# Patient Record
Sex: FEMALE | Race: WHITE | Employment: UNEMPLOYED | URBAN - METROPOLITAN AREA
[De-identification: names, ages, dates, MRNs, and addresses within clinical notes are randomized per-mention and may not be internally consistent; named-entity substitution may affect disease eponyms.]

---

## 2021-05-17 ENCOUNTER — HOSPITAL ENCOUNTER (OUTPATIENT)
Dept: MAMMOGRAPHY | Age: 61
Discharge: HOME | End: 2021-05-17
Attending: OBSTETRICS & GYNECOLOGY
Payer: COMMERCIAL

## 2021-05-17 DIAGNOSIS — Z12.31 OTHER SCREENING MAMMOGRAM: ICD-10-CM

## 2021-05-17 DIAGNOSIS — M85.80 OSTEOPENIA, UNSPECIFIED LOCATION: ICD-10-CM

## 2021-05-17 PROCEDURE — 77080 DXA BONE DENSITY AXIAL: CPT

## 2021-05-17 PROCEDURE — 77067 SCR MAMMO BI INCL CAD: CPT

## 2021-05-21 PROBLEM — Z78.0 OSTEOPENIA AFTER MENOPAUSE: Status: ACTIVE | Noted: 2021-05-21

## 2021-05-21 PROBLEM — M85.80 OSTEOPENIA AFTER MENOPAUSE: Status: ACTIVE | Noted: 2021-05-21

## 2021-08-21 ENCOUNTER — HOSPITAL ENCOUNTER (EMERGENCY)
Age: 61
Discharge: HOME | End: 2021-08-21
Attending: EMERGENCY MEDICINE
Payer: COMMERCIAL

## 2021-08-21 VITALS
HEART RATE: 65 BPM | RESPIRATION RATE: 18 BRPM | BODY MASS INDEX: 36.14 KG/M2 | SYSTOLIC BLOOD PRESSURE: 138 MMHG | DIASTOLIC BLOOD PRESSURE: 74 MMHG | OXYGEN SATURATION: 98 % | WEIGHT: 204 LBS | TEMPERATURE: 98 F

## 2021-08-21 DIAGNOSIS — T78.40XA ALLERGIC REACTION, INITIAL ENCOUNTER: Primary | ICD-10-CM

## 2021-08-21 PROCEDURE — 74011250636 HC RX REV CODE- 250/636: Performed by: EMERGENCY MEDICINE

## 2021-08-21 PROCEDURE — 99284 EMERGENCY DEPT VISIT MOD MDM: CPT

## 2021-08-21 PROCEDURE — 96374 THER/PROPH/DIAG INJ IV PUSH: CPT

## 2021-08-21 RX ORDER — SODIUM CHLORIDE 0.9 % (FLUSH) 0.9 %
5-10 SYRINGE (ML) INJECTION AS NEEDED
Status: DISCONTINUED | OUTPATIENT
Start: 2021-08-21 | End: 2021-08-22 | Stop reason: HOSPADM

## 2021-08-21 RX ORDER — SODIUM CHLORIDE 0.9 % (FLUSH) 0.9 %
5-10 SYRINGE (ML) INJECTION EVERY 8 HOURS
Status: DISCONTINUED | OUTPATIENT
Start: 2021-08-21 | End: 2021-08-22 | Stop reason: HOSPADM

## 2021-08-21 RX ORDER — FAMOTIDINE 10 MG/ML
40 INJECTION INTRAVENOUS
Status: DISCONTINUED | OUTPATIENT
Start: 2021-08-21 | End: 2021-08-21

## 2021-08-21 RX ORDER — DIPHENHYDRAMINE HYDROCHLORIDE 50 MG/ML
50 INJECTION, SOLUTION INTRAMUSCULAR; INTRAVENOUS
Status: DISCONTINUED | OUTPATIENT
Start: 2021-08-21 | End: 2021-08-21

## 2021-08-21 RX ORDER — EPINEPHRINE 0.3 MG/.3ML
0.3 INJECTION SUBCUTANEOUS
Qty: 2 SYRINGE | Refills: 0 | Status: SHIPPED | OUTPATIENT
Start: 2021-08-21 | End: 2021-08-21

## 2021-08-21 RX ORDER — PREDNISONE 50 MG/1
50 TABLET ORAL DAILY
Qty: 5 TABLET | Refills: 0 | Status: SHIPPED | OUTPATIENT
Start: 2021-08-21 | End: 2021-08-26

## 2021-08-21 RX ADMIN — METHYLPREDNISOLONE SODIUM SUCCINATE 125 MG: 125 INJECTION, POWDER, FOR SOLUTION INTRAMUSCULAR; INTRAVENOUS at 22:35

## 2021-08-22 NOTE — ED TRIAGE NOTES
Pt states she is feeling swelling to her throat and back of her tongue unsure what the allergy is.  Pt states this has been happening on and off and she is to follow up with an allergist

## 2021-08-22 NOTE — ED NOTES
I have reviewed discharge instructions with the patient. The patient verbalized understanding. Patient left ED via Discharge Method: ambulatory to Home with ( self,). Opportunity for questions and clarification provided. Patient given 2 scripts. To continue your aftercare when you leave the hospital, you may receive an automated call from our care team to check in on how you are doing. This is a free service and part of our promise to provide the best care and service to meet your aftercare needs.  If you have questions, or wish to unsubscribe from this service please call [phone]. Thank you for Choosing our [facility] Emergency Department.

## 2021-08-22 NOTE — ED PROVIDER NOTES
Mask was worn during the entire patient examination. Demi Singh is a 61 y.o. female who presents to the ED with a chief complaint of allergic reaction. Patient has a history of several months of off and on throat irritation symptoms. Patient has tried to figure out what is causing it but has been unsuccessful. She has been planning on getting in to see an allergies but has not yet. Tonight she had some swelling to the sides of her throat and felt some tightness this has gradually improved. Patient did take zyrtec. The history is provided by the patient. Allergic Reaction   Pertinent negatives include no nausea, no vomiting and no shortness of breath.         Past Medical History:   Diagnosis Date    Depression     History of chicken pox     Osteopenia 2018    Post-menopausal        Past Surgical History:   Procedure Laterality Date    HX  SECTION      x 2     HX  SECTION      x2         Family History:   Problem Relation Age of Onset    Heart Disease Mother 48        ablation, pacer, bronchiectasis,  at 80    Hypertension Father 48    Cancer Father     Prostate Cancer Father    [address] [facility] Arthritis-rheumatoid Father     Osteoporosis Sister 48    Breast Cancer Maternal Aunt 61    Colon Cancer Maternal Aunt 48    Colon Cancer Maternal Uncle 61    Diabetes Maternal Grandfather 79    Osteoporosis Sister 61    Ovarian Cancer Sister 77       Social History     Socioeconomic History    Marital status:      Spouse name: Not on file    Number of children: Not on file    Years of education: Not on file    Highest education level: Not on file   Occupational History    Not on file   Tobacco Use    Smoking status: Never Smoker    Smokeless tobacco: Never Used   Vaping Use    Vaping Use: Never used   Substance and Sexual Activity    Alcohol use: No    Drug use: Not on file    Sexual activity: Yes     Partners: Male   Other Topics Concern    Not on file   Social History Narrative    Not on file     Social Determinants of Health     Financial Resource Strain:     Difficulty of Paying Living Expenses:    Food Insecurity:     Worried About Running Out of Food in the Last Year:     [address] in the Last Year:    Transportation Needs:     Lack of Transportation (Medical):  Lack of Transportation (Non-Medical):    Physical Activity:     Days of Exercise per Week:     Minutes of Exercise per Session:    Stress:     Feeling of Stress :    Social Connections:     Frequency of Communication with Friends and Family:     Frequency of Social Gatherings with Friends and Family:     Attends Shinto Services:     Active Member of Clubs or Organizations:     Attends Club or Organization Meetings:     Marital Status:    Intimate Partner Violence:     Fear of Current or Ex-Partner:     Emotionally Abused:     Physically Abused:     Sexually Abused: ALLERGIES: Cantaloupe, Codeine, Fish containing products, Naproxen, and Sulfa (sulfonamide antibiotics)    Review of Systems   Constitutional: Negative for chills and fever. Respiratory: Negative for chest tightness, shortness of breath, wheezing and stridor. Cardiovascular: Negative for chest pain and palpitations. Gastrointestinal: Negative for abdominal pain, diarrhea, nausea and vomiting. Musculoskeletal: Negative for arthralgias, myalgias, neck pain and neck stiffness. Skin: Negative for color change, pallor and wound. Neurological: Negative for dizziness, tremors, seizures, weakness, light-headedness, numbness and headaches. All other systems reviewed and are negative. Vitals:    08/21/21 2156 08/21/21 2200 08/21/21 2220 08/21/21 2223   BP: (!) 170/76 (!) 150/70 (!) 146/70    Pulse: (!) 59 (!) 59  61   Resp:       Temp:       SpO2: 97% 98% 97% 97%   Weight:                Physical Exam  Vitals and nursing note reviewed. Constitutional:       General: She is not in acute distress. Appearance: She is well-developed. She is not ill-appearing, toxic-appearing or diaphoretic. HENT:      Head: Normocephalic and atraumatic. Mouth/Throat:      Comments: Oropharynx is patent. Eyes:      General: No scleral icterus. Conjunctiva/sclera: Conjunctivae normal.   Cardiovascular:      Rate and Rhythm: Normal rate and regular rhythm. Pulmonary:      Effort: Pulmonary effort is normal. No respiratory distress. Breath sounds: No stridor. Abdominal:      General: Abdomen is flat. Tenderness: There is no abdominal tenderness. There is no guarding or rebound. Musculoskeletal:         General: Normal range of motion. Cervical back: Normal range of motion and neck supple. No rigidity or tenderness. Skin:     Coloration: Skin is not jaundiced or pale. Findings: No rash. Neurological:      General: No focal deficit present. Mental Status: She is alert. Mental status is at baseline. Psychiatric:         Mood and Affect: Mood normal.         Behavior: Behavior normal.          MDM  Number of Diagnoses or Management Options  Diagnosis management comments: Patient looks well her airways are patent. She is in no distress. We will start her on prednisone and give her an epipen to use as needed no signs of anaphylaxis now. Rika Sanchez MD; 8/21/2021 @10:51 PM Voice dictation software was used during the making of this note. This software is not perfect and grammatical and other typographical errors may be present.   This note has not been proofread for errors.  ===================================================================            Procedures

## 2021-09-08 PROBLEM — I48.0 PAROXYSMAL ATRIAL FIBRILLATION (HCC): Status: ACTIVE | Noted: 2021-09-08

## 2022-01-12 PROBLEM — R00.2 PALPITATION: Status: ACTIVE | Noted: 2018-10-23

## 2022-01-12 PROBLEM — M81.0 MENOPAUSAL OSTEOPOROSIS: Status: ACTIVE | Noted: 2018-10-23

## 2022-01-12 PROBLEM — R53.83 MALAISE AND FATIGUE: Status: ACTIVE | Noted: 2018-10-23

## 2022-01-12 PROBLEM — G44.209 ACUTE NON INTRACTABLE TENSION-TYPE HEADACHE: Status: ACTIVE | Noted: 2018-10-23

## 2022-01-12 PROBLEM — R53.81 MALAISE AND FATIGUE: Status: ACTIVE | Noted: 2018-10-23

## 2022-01-12 PROBLEM — R03.0 ELEVATED BLOOD PRESSURE READING: Status: ACTIVE | Noted: 2018-10-23

## 2022-03-18 PROBLEM — M81.0 MENOPAUSAL OSTEOPOROSIS: Status: ACTIVE | Noted: 2018-10-23

## 2022-03-18 PROBLEM — R03.0 ELEVATED BLOOD PRESSURE READING: Status: ACTIVE | Noted: 2018-10-23

## 2022-03-19 PROBLEM — R53.81 MALAISE AND FATIGUE: Status: ACTIVE | Noted: 2018-10-23

## 2022-03-19 PROBLEM — M85.80 OSTEOPENIA AFTER MENOPAUSE: Status: ACTIVE | Noted: 2021-05-21

## 2022-03-19 PROBLEM — R00.2 PALPITATION: Status: ACTIVE | Noted: 2018-10-23

## 2022-03-19 PROBLEM — I48.0 PAROXYSMAL ATRIAL FIBRILLATION (HCC): Status: ACTIVE | Noted: 2021-09-08

## 2022-03-19 PROBLEM — R53.83 MALAISE AND FATIGUE: Status: ACTIVE | Noted: 2018-10-23

## 2022-03-19 PROBLEM — Z78.0 OSTEOPENIA AFTER MENOPAUSE: Status: ACTIVE | Noted: 2021-05-21

## 2022-03-19 PROBLEM — G44.209 ACUTE NON INTRACTABLE TENSION-TYPE HEADACHE: Status: ACTIVE | Noted: 2018-10-23

## 2022-08-01 ENCOUNTER — TELEPHONE (OUTPATIENT)
Dept: OBGYN CLINIC | Age: 62
End: 2022-08-01

## 2022-08-01 NOTE — TELEPHONE ENCOUNTER
Phone call from patient stating she has a sore vaginal lump that just formed over the last few days. Problem visit appointment made for tomorrow morning for patient with Dr Yonas Michelle.

## 2022-08-02 ENCOUNTER — OFFICE VISIT (OUTPATIENT)
Dept: OBGYN CLINIC | Age: 62
End: 2022-08-02
Payer: COMMERCIAL

## 2022-08-02 VITALS
DIASTOLIC BLOOD PRESSURE: 86 MMHG | SYSTOLIC BLOOD PRESSURE: 158 MMHG | BODY MASS INDEX: 36.14 KG/M2 | WEIGHT: 204 LBS | HEIGHT: 63 IN

## 2022-08-02 DIAGNOSIS — N90.7 INCLUSION CYST OF VULVA: Primary | ICD-10-CM

## 2022-08-02 PROCEDURE — 99213 OFFICE O/P EST LOW 20 MIN: CPT | Performed by: OBSTETRICS & GYNECOLOGY

## 2022-08-02 NOTE — PROGRESS NOTES
2022    Luacs Atkinson  1960    64 y.o. No obstetric history on file. female with complaints of painful vaginal lump. Noted a few days ago. Has never had anything like this before. No assoc symptoms like fever, malaise. Has used epsom salt bath, vaseline. No flowsheet data found. OB History          2    Para   2    Term   2            AB        Living   2         SAB        IAB        Ectopic        Molar        Multiple        Live Births   2                Past Medical History:   Diagnosis Date    Atrial fibrillation, controlled ([address]     History of chicken pox     Osteopenia 2018    Post-menopausal      Past Surgical History:   Procedure Laterality Date     SECTION      x 2      SECTION      x2       Current Outpatient Medications on File Prior to Visit   Medication Sig Dispense Refill    MAGNESIUM PO Take by mouth      Coenzyme Q10 (COQ10 PO) Take by mouth      Ascorbic Acid (VITAMIN C PO) Take by mouth      VITAMIN D PO Take by mouth      cetirizine (ZYRTEC) 5 MG tablet Take by mouth      CALCIUM-MAGNESIUM-ZINC PO Take by mouth (Patient not taking: Reported on 2022)      TURMERIC PO Take by mouth daily      Lisa, Zingiber officinalis, 250 MG CAPS Take 550 mg by mouth daily       No current facility-administered medications on file prior to visit. PHYSICAL EXAM:  BP (!) 158/86   Ht 5' 3\" (1.6 m)   Wt 204 lb (92.5 kg)   BMI 36.14 kg/m²    Body mass index is 36.14 kg/m². The patient appears well, alert, oriented x 3, in no apparent distress. Speech and thought content appropriate. Affect bright. Well developed. Well nourished. HEENT: atraumatic, normocephalic. Sclerae nonicteric. Pelvic: pt has 1cm inclusion cyst on the L anterior labia minora. No surrounding erythema. Looks like it is close to draining on its own. Perineum and anus normal. No hemorrhoids. Neuro grossly intact.     Bre Heredia was seen today for vaginal pain.    Diagnoses and all orders for this visit:    Inclusion cyst of vulva       No s/s secondary infxn. Explained the pathophys of this. Continue the current care she is already undertaking. Plus use warm moist compresses as much as poss to encourage drainage. Rtn for work in appt if she notes redness, increased swelling/pain, any signs of infxn.      Cesilia Lee MD, MD

## 2022-09-19 ENCOUNTER — OFFICE VISIT (OUTPATIENT)
Dept: OBGYN CLINIC | Age: 62
End: 2022-09-19
Payer: COMMERCIAL

## 2022-09-19 VITALS
BODY MASS INDEX: 35.97 KG/M2 | SYSTOLIC BLOOD PRESSURE: 144 MMHG | WEIGHT: 203 LBS | HEIGHT: 63 IN | DIASTOLIC BLOOD PRESSURE: 82 MMHG

## 2022-09-19 DIAGNOSIS — Z01.419 WELL WOMAN EXAM: Primary | ICD-10-CM

## 2022-09-19 DIAGNOSIS — Z12.31 ENCOUNTER FOR SCREENING MAMMOGRAM FOR MALIGNANT NEOPLASM OF BREAST: ICD-10-CM

## 2022-09-19 PROCEDURE — 99396 PREV VISIT EST AGE 40-64: CPT | Performed by: OBSTETRICS & GYNECOLOGY

## 2022-09-19 NOTE — PROGRESS NOTES
2022    Lena Radisamir  1960      PCP: None Provider  Patient does not see them for regular preventative visits. HPI: 58y.o. year old  Here for annual gyn wellness exam.   The cyst I saw her for in Aug has drained. The PCP she was seeing has left town. Pt would like lipids and D cked. No LMP recorded. Patient is postmenopausal.    Social History     Socioeconomic History    Marital status:      Spouse name: None    Number of children: None    Years of education: None    Highest education level: None   Tobacco Use    Smoking status: Never    Smokeless tobacco: Never   Substance and Sexual Activity    Alcohol use: No    Sexual activity: Not Currently     Partners: Male     Last pap - neg cotest 3-2021. No hx abnl paps  Lipids- last cked   Colonoscopy- done at age 64. No polyps. Pt unsure when she needs to repeat. She thinks 10yrs  DEXA- osteopenia   Mammogram- NL         Allergies, medications, past medical and surgical history, social history, family history all reviewed.        Review of Systems      Constitutional:  Denies unexplained weight loss/gain or heat/ cold intolerance/loss of balance  ENT: Denies blurred vision, loss of hearing, hoarseness  Cardiovascular:  Denies chest pain, swelling in legs or feet, shortness of breath when lying flat  Respiratory:  Denies shortness of breath, cough greater than 2 weeks or coughing up blood  Gastro: Denies diarrhea greater than 2 weeks, rectal bleeding, bloody stools, heartburn, or constipation  :  Denies blood in urine, nocturia, dysuria or incontinence  Breast:  Denies nipple discharge, masses or pain  Skin:  Denies rash greater than 2 weeks, change in moles  Musculoskeletal/Neuro:  Denies joint pain, muscle weakness, seizures, headaches  Psych:  Denies new onset depression, anxiety, panic attacks  Heme:  Denies easy bruising, bleeding gums, frequent nosebleeds or swollen lymph nodes  GYN:  Denies bleeding or spotting between menses, heavy menses, menses longer than 7 days, pain with sex, severe menstrual cramps, bleeding after sex    Patient referred to a PCP for any significant nongyn findings on ROS. BP (!) 144/82   Ht 5' 3\" (1.6 m)   Wt 203 lb (92.1 kg)   BMI 35.96 kg/m²     Body mass index is 35.96 kg/m². Wt Readings from Last 3 Encounters:   09/19/22 203 lb (92.1 kg)   08/02/22 204 lb (92.5 kg)   01/12/22 205 lb (93 kg)         Pt in no distress. Alert and oriented x3. Affect bright. Well developed, well nourished. HEENT: normocephalic, atraumatic. Sclerae nonicteric. Neck supple w/o thyromegaly. Trachea midline. Lungs:  Respiratory effort normal.   Breasts: no masses or axillary lymphadenopathy  Abdomen: soft and nontender, no masses, no organomegaly, no ventral hernia  Pelvic: normal external genitalia, urethral meatus, vagina and cervix. Bimanual exam w/o obvious masses or tenderness. Bladder nontender. Uterus normal size. Adnexae normal.  Perineum and anus normal. No hemorrhoids. Rectovaginal: no masses. Hemocult negative. Samy Hernandez was seen today for annual exam.    Diagnoses and all orders for this visit:    Well woman exam    Encounter for screening mammogram for malignant neoplasm of breast  -     MELISA MICHAEL DIGITAL SCREEN BILATERAL; Future          Pt counseling:   D- takes 5000 most  days. Had normal level in 2020  Ca- takes 500 tid  Walking daily- usually 2hrs a wk  Ok for paps q2-3yrs until age 72. Pt might decide to rtn for fasting lipids and [facility]. Has had shingrix. Pt wants to go somewhere else besides [facility] for mammo. [employer] paid her bill and [facility] is still trying to collect, saying they have not been paid, sending her to collections.        Thuan Conway MD, MD

## 2023-01-10 DIAGNOSIS — Z12.31 ENCOUNTER FOR SCREENING MAMMOGRAM FOR MALIGNANT NEOPLASM OF BREAST: ICD-10-CM

## 2023-09-25 SDOH — ECONOMIC STABILITY: TRANSPORTATION INSECURITY
IN THE PAST 12 MONTHS, HAS LACK OF TRANSPORTATION KEPT YOU FROM MEETINGS, WORK, OR FROM GETTING THINGS NEEDED FOR DAILY LIVING?: NO

## 2023-09-25 SDOH — ECONOMIC STABILITY: HOUSING INSECURITY
IN THE LAST 12 MONTHS, WAS THERE A TIME WHEN YOU DID NOT HAVE A STEADY PLACE TO SLEEP OR SLEPT IN A SHELTER (INCLUDING NOW)?: NO

## 2023-09-25 SDOH — ECONOMIC STABILITY: FOOD INSECURITY: WITHIN THE PAST 12 MONTHS, YOU WORRIED THAT YOUR FOOD WOULD RUN OUT BEFORE YOU GOT MONEY TO BUY MORE.: NEVER TRUE

## 2023-09-25 SDOH — ECONOMIC STABILITY: FOOD INSECURITY: WITHIN THE PAST 12 MONTHS, THE FOOD YOU BOUGHT JUST DIDN'T LAST AND YOU DIDN'T HAVE MONEY TO GET MORE.: NEVER TRUE

## 2023-09-25 SDOH — ECONOMIC STABILITY: INCOME INSECURITY: HOW HARD IS IT FOR YOU TO PAY FOR THE VERY BASICS LIKE FOOD, HOUSING, MEDICAL CARE, AND HEATING?: NOT HARD AT ALL

## 2023-09-25 NOTE — PROGRESS NOTES
she expressed she is not interested in meds. She has a plan for exercise and wt loss  D/w her Dr Matthew Jauregui developing a high risk breast program.  [place] in Jan- she should have been able to get counseling/poss genetic testing by then.        Christopher Chang MD

## 2023-09-26 ENCOUNTER — OFFICE VISIT (OUTPATIENT)
Dept: OBGYN CLINIC | Age: 63
End: 2023-09-26
Payer: COMMERCIAL

## 2023-09-26 VITALS
WEIGHT: 208 LBS | SYSTOLIC BLOOD PRESSURE: 128 MMHG | HEIGHT: 63 IN | BODY MASS INDEX: 36.86 KG/M2 | DIASTOLIC BLOOD PRESSURE: 78 MMHG

## 2023-09-26 DIAGNOSIS — Z01.419 WELL WOMAN EXAM: Primary | ICD-10-CM

## 2023-09-26 DIAGNOSIS — M85.89 OSTEOPENIA OF MULTIPLE SITES: ICD-10-CM

## 2023-09-26 DIAGNOSIS — Z12.31 ENCOUNTER FOR SCREENING MAMMOGRAM FOR MALIGNANT NEOPLASM OF BREAST: ICD-10-CM

## 2023-09-26 DIAGNOSIS — Z80.3 FAMILY HISTORY OF BREAST CANCER IN FIRST DEGREE RELATIVE: ICD-10-CM

## 2023-09-26 PROBLEM — R53.81 MALAISE AND FATIGUE: Status: RESOLVED | Noted: 2018-10-23 | Resolved: 2023-09-26

## 2023-09-26 PROBLEM — R00.2 PALPITATION: Status: RESOLVED | Noted: 2018-10-23 | Resolved: 2023-09-26

## 2023-09-26 PROBLEM — G44.209 ACUTE NON INTRACTABLE TENSION-TYPE HEADACHE: Status: RESOLVED | Noted: 2018-10-23 | Resolved: 2023-09-26

## 2023-09-26 PROBLEM — R53.83 MALAISE AND FATIGUE: Status: RESOLVED | Noted: 2018-10-23 | Resolved: 2023-09-26

## 2023-09-26 PROBLEM — R03.0 ELEVATED BLOOD PRESSURE READING: Status: ACTIVE | Noted: 2018-10-23

## 2023-09-26 PROBLEM — M81.0 MENOPAUSAL OSTEOPOROSIS: Status: ACTIVE | Noted: 2018-10-23

## 2023-09-26 PROBLEM — I48.0 PAROXYSMAL ATRIAL FIBRILLATION (HCC): Status: ACTIVE | Noted: 2021-09-08

## 2023-09-26 PROCEDURE — 99396 PREV VISIT EST AGE 40-64: CPT | Performed by: OBSTETRICS & GYNECOLOGY

## 2023-09-27 ENCOUNTER — TELEPHONE (OUTPATIENT)
Dept: OBGYN CLINIC | Age: 63
End: 2023-09-27

## 2023-09-27 NOTE — TELEPHONE ENCOUNTER
Patient called and said that the genetic testing was not able to schedule her until march. She would like to know does she still need to keep her appointment in January.  Phone number is [phone]

## 2024-01-04 DIAGNOSIS — Z12.31 ENCOUNTER FOR SCREENING MAMMOGRAM FOR MALIGNANT NEOPLASM OF BREAST: ICD-10-CM

## 2024-01-14 NOTE — PROGRESS NOTES
1/15/2024      Sneha Atkinson  : 1960  63 y.o.      HPI: here to discuss fam hx breast cancer. Also other cancers.   She is unable to get in with genetics counseling until .     Pt did her TC risk on the TC website and got 8.7% 10yr and 16.3% for lifetime. Done 24    Father with prostate ca  Sister with breast ca at 69, and endometrial ca at 67  Sister with breast ca at 72  Mat aunt with breast and colon ca, at 60 and 50  Mat uncle with colon ca at 60  See fam hx for other persons affected with kidney ca, brain ca, colon polyps- added today    She brought more fam hx for me today:  For her sister Kristi, she has her genetic results. BRCA 1/2neg. Variant in CDH1 \"likely pathogenic\". [facility] thru [facility] done . This also confers hi risk for gastric cancer.    For her sister Malini,   [facility] 96 genes tested   VUS in BLM, MUTYH, TERT    NL mammogram 24. Dai BD2. 5yr risk calc'd by them at 6%  She had colonoscopy at age 56    Exam:  /82   Ht 1.6 m (5' 3\")   Wt 95.3 kg (210 lb)   BMI 37.20 kg/m²     Body mass index is 37.2 kg/m².    Pt in no distress. Alert and oriented x3. Affect bright.  Well developed, well nourished.  HEENT: normocephalic, atraumatic. Sclerae nonicteric.  Breasts: no masses or axillary lymphadenopathy   Neuro: grossly intact    Sneha was seen today for follow-up.    Diagnoses and all orders for this visit:    Family history of breast cancer in first degree relative    Family history of prostate cancer in father    Family history of colon cancer    Family history of malignant neoplasm of endometrium    Family history of colonic polyps    Family history of kidney cancer    Pt is definitely interested in genetic testing. We considered doing this today, but she decided to wait until after genetic counseling d/t Qs about best company to use, and poss financial responsibility.  D/w her that she is Not a candidate for screen MRI  Risk reducing med

## 2024-01-15 ENCOUNTER — OFFICE VISIT (OUTPATIENT)
Dept: OBGYN CLINIC | Age: 64
End: 2024-01-15
Payer: COMMERCIAL

## 2024-01-15 VITALS
WEIGHT: 210 LBS | SYSTOLIC BLOOD PRESSURE: 134 MMHG | DIASTOLIC BLOOD PRESSURE: 82 MMHG | BODY MASS INDEX: 37.21 KG/M2 | HEIGHT: 63 IN

## 2024-01-15 DIAGNOSIS — Z80.51 FAMILY HISTORY OF KIDNEY CANCER: ICD-10-CM

## 2024-01-15 DIAGNOSIS — Z80.42 FAMILY HISTORY OF PROSTATE CANCER IN FATHER: ICD-10-CM

## 2024-01-15 DIAGNOSIS — Z83.719 FAMILY HISTORY OF COLONIC POLYPS: ICD-10-CM

## 2024-01-15 DIAGNOSIS — Z80.49 FAMILY HISTORY OF MALIGNANT NEOPLASM OF ENDOMETRIUM: ICD-10-CM

## 2024-01-15 DIAGNOSIS — Z80.0 FAMILY HISTORY OF COLON CANCER: ICD-10-CM

## 2024-01-15 DIAGNOSIS — Z80.3 FAMILY HISTORY OF BREAST CANCER IN FIRST DEGREE RELATIVE: Primary | ICD-10-CM

## 2024-01-15 PROBLEM — M81.0 MENOPAUSAL OSTEOPOROSIS: Status: RESOLVED | Noted: 2018-10-23 | Resolved: 2024-01-15

## 2024-01-15 PROCEDURE — 99214 OFFICE O/P EST MOD 30 MIN: CPT | Performed by: OBSTETRICS & GYNECOLOGY

## 2024-01-15 RX ORDER — CEFDINIR 300 MG/1
300 CAPSULE ORAL DAILY
COMMUNITY
Start: 2024-01-03

## 2024-02-05 NOTE — PROGRESS NOTES
[facility] - Initial Evaluation   Patient: Sneha Atkinson   YOB: 1960      Location: [facility] HEMATOLOGY AND ONCOLOGY - Provider participated in today's evaluation via telemedicine in [place], [place]. [place] completed today's evaluation from [place].   Date of Evaluation: 3/5/2024      Referral Source:  Vickie Levi MD   Referral Reason: Z80.3 (ICD-10-CM) - Family history of breast cancer in first degree relative      Genetic Counselor: Arlene Alvarado MS, [facility] [place] [facility]      Sneha Atkinson was referred for evaluation for the potential of hereditary cancer due to her family history of cancer. Sneha has consented to a visit via telehealth.   Personalized risk assessment was performed and genetic testing options were reviewed.  For full details, please see Risk Assessment and Discussion in this document.  Following genetic counseling, Sneha's action plan is:  DEFERS TESTING: Following discussion, Sneha did not opt to proceed with testing today.      Relevant Personal Medical History   Sneha is a 63-year-old female with no personal history of cancer.    Hormonal history:  Age of Menarche: 13  Age of First Live Birth: 27  Age of Menopause: 51  Hormonal Replacement Therapy: Denies    Screening history:  Last mammogram: 24  History of breast biopsy: Denies  Last gynecological exam:   Latest colonoscopy: Age 56, asked to return in 10 years  History of polyps: Denies  Last dermatological exam: Denies  History of additional abnormal cancer screening: Denies    Social history:  Tobacco use: Denies  Alcohol use: Denies    General medical history:  Past Medical History:   Diagnosis Date    Atrial fibrillation, controlled (HCC)     History of chicken pox     Osteopenia 2018    Post-menopausal        Surgical history:  Hysterectomy: Denies  Bilateral salpingo oophorectomy: Denies  Past Surgical History:   Procedure Laterality Date     SECTION      x 2      SECTION

## 2024-03-05 ENCOUNTER — TELEMEDICINE (OUTPATIENT)
Dept: ONCOLOGY | Age: 64
End: 2024-03-05

## 2024-03-05 DIAGNOSIS — Z80.3 FAMILY HISTORY OF MALIGNANT NEOPLASM OF BREAST: Primary | ICD-10-CM
